# Patient Record
Sex: MALE | Race: WHITE | Employment: UNEMPLOYED | URBAN - METROPOLITAN AREA
[De-identification: names, ages, dates, MRNs, and addresses within clinical notes are randomized per-mention and may not be internally consistent; named-entity substitution may affect disease eponyms.]

---

## 2023-03-26 ENCOUNTER — APPOINTMENT (OUTPATIENT)
Dept: GENERAL RADIOLOGY | Age: 63
End: 2023-03-26
Payer: COMMERCIAL

## 2023-03-26 ENCOUNTER — HOSPITAL ENCOUNTER (EMERGENCY)
Age: 63
Discharge: ANOTHER HOSPITAL | End: 2023-03-26
Attending: EMERGENCY MEDICINE
Payer: COMMERCIAL

## 2023-03-26 VITALS
OXYGEN SATURATION: 100 % | DIASTOLIC BLOOD PRESSURE: 61 MMHG | WEIGHT: 148 LBS | BODY MASS INDEX: 22.43 KG/M2 | HEART RATE: 92 BPM | TEMPERATURE: 97.6 F | SYSTOLIC BLOOD PRESSURE: 97 MMHG | HEIGHT: 68 IN | RESPIRATION RATE: 21 BRPM

## 2023-03-26 DIAGNOSIS — R42 POSTURAL DIZZINESS WITH NEAR SYNCOPE: Primary | ICD-10-CM

## 2023-03-26 DIAGNOSIS — R55 POSTURAL DIZZINESS WITH NEAR SYNCOPE: Primary | ICD-10-CM

## 2023-03-26 DIAGNOSIS — K92.1 GASTROINTESTINAL HEMORRHAGE WITH MELENA: ICD-10-CM

## 2023-03-26 LAB
ABSOLUTE EOS #: 0.05 K/UL (ref 0–0.44)
ABSOLUTE IMMATURE GRANULOCYTE: 0.07 K/UL (ref 0–0.3)
ABSOLUTE LYMPH #: 2.2 K/UL (ref 1.1–3.7)
ABSOLUTE MONO #: 0.81 K/UL (ref 0.1–1.2)
ANION GAP SERPL CALCULATED.3IONS-SCNC: 13 MMOL/L (ref 9–17)
BASOPHILS # BLD: 1 % (ref 0–2)
BASOPHILS ABSOLUTE: 0.08 K/UL (ref 0–0.2)
BNP SERPL-MCNC: 2199 PG/ML
BUN SERPL-MCNC: 61 MG/DL (ref 8–23)
CALCIUM SERPL-MCNC: 8.9 MG/DL (ref 8.6–10.4)
CHLORIDE SERPL-SCNC: 97 MMOL/L (ref 98–107)
CO2 SERPL-SCNC: 21 MMOL/L (ref 20–31)
CREAT SERPL-MCNC: 0.91 MG/DL (ref 0.7–1.2)
EOSINOPHILS RELATIVE PERCENT: 1 % (ref 1–4)
GFR SERPL CREATININE-BSD FRML MDRD: >60 ML/MIN/1.73M2
GLUCOSE SERPL-MCNC: 116 MG/DL (ref 70–99)
HCT VFR BLD AUTO: 28.9 % (ref 40.7–50.3)
HGB BLD-MCNC: 8.9 G/DL (ref 13–17)
IMMATURE GRANULOCYTES: 1 %
INR PPP: 3.2
LYMPHOCYTES # BLD: 21 % (ref 24–43)
MAGNESIUM SERPL-MCNC: 1.6 MG/DL (ref 1.6–2.6)
MCH RBC QN AUTO: 29.6 PG (ref 25.2–33.5)
MCHC RBC AUTO-ENTMCNC: 30.8 G/DL (ref 28.4–34.8)
MCV RBC AUTO: 96 FL (ref 82.6–102.9)
MONOCYTES # BLD: 8 % (ref 3–12)
NRBC AUTOMATED: 0 PER 100 WBC
PDW BLD-RTO: 14.8 % (ref 11.8–14.4)
PLATELET # BLD AUTO: 347 K/UL (ref 138–453)
PMV BLD AUTO: 9 FL (ref 8.1–13.5)
POTASSIUM SERPL-SCNC: 4.9 MMOL/L (ref 3.7–5.3)
PROTHROMBIN TIME: 32.5 SEC (ref 11.7–14.9)
RBC # BLD: 3.01 M/UL (ref 4.21–5.77)
RBC # BLD: ABNORMAL 10*6/UL
SEG NEUTROPHILS: 68 % (ref 36–65)
SEGMENTED NEUTROPHILS ABSOLUTE COUNT: 7.33 K/UL (ref 1.5–8.1)
SODIUM SERPL-SCNC: 131 MMOL/L (ref 135–144)
TROPONIN I SERPL DL<=0.01 NG/ML-MCNC: 32 NG/L (ref 0–22)
TROPONIN I SERPL DL<=0.01 NG/ML-MCNC: 35 NG/L (ref 0–22)
WBC # BLD AUTO: 10.5 K/UL (ref 3.5–11.3)

## 2023-03-26 PROCEDURE — 84484 ASSAY OF TROPONIN QUANT: CPT

## 2023-03-26 PROCEDURE — 85025 COMPLETE CBC W/AUTO DIFF WBC: CPT

## 2023-03-26 PROCEDURE — 93005 ELECTROCARDIOGRAM TRACING: CPT | Performed by: EMERGENCY MEDICINE

## 2023-03-26 PROCEDURE — 99285 EMERGENCY DEPT VISIT HI MDM: CPT

## 2023-03-26 PROCEDURE — 2580000003 HC RX 258: Performed by: EMERGENCY MEDICINE

## 2023-03-26 PROCEDURE — 96366 THER/PROPH/DIAG IV INF ADDON: CPT

## 2023-03-26 PROCEDURE — C9113 INJ PANTOPRAZOLE SODIUM, VIA: HCPCS | Performed by: EMERGENCY MEDICINE

## 2023-03-26 PROCEDURE — 96367 TX/PROPH/DG ADDL SEQ IV INF: CPT

## 2023-03-26 PROCEDURE — 94761 N-INVAS EAR/PLS OXIMETRY MLT: CPT

## 2023-03-26 PROCEDURE — 85610 PROTHROMBIN TIME: CPT

## 2023-03-26 PROCEDURE — 96365 THER/PROPH/DIAG IV INF INIT: CPT

## 2023-03-26 PROCEDURE — 80048 BASIC METABOLIC PNL TOTAL CA: CPT

## 2023-03-26 PROCEDURE — 83735 ASSAY OF MAGNESIUM: CPT

## 2023-03-26 PROCEDURE — 83880 ASSAY OF NATRIURETIC PEPTIDE: CPT

## 2023-03-26 PROCEDURE — 6360000002 HC RX W HCPCS: Performed by: EMERGENCY MEDICINE

## 2023-03-26 PROCEDURE — 71045 X-RAY EXAM CHEST 1 VIEW: CPT

## 2023-03-26 PROCEDURE — 96361 HYDRATE IV INFUSION ADD-ON: CPT

## 2023-03-26 RX ORDER — 0.9 % SODIUM CHLORIDE 0.9 %
500 INTRAVENOUS SOLUTION INTRAVENOUS ONCE
Status: COMPLETED | OUTPATIENT
Start: 2023-03-26 | End: 2023-03-26

## 2023-03-26 RX ORDER — 0.9 % SODIUM CHLORIDE 0.9 %
1000 INTRAVENOUS SOLUTION INTRAVENOUS ONCE
Status: DISCONTINUED | OUTPATIENT
Start: 2023-03-26 | End: 2023-03-26

## 2023-03-26 RX ORDER — MAGNESIUM SULFATE IN WATER 40 MG/ML
2000 INJECTION, SOLUTION INTRAVENOUS ONCE
Status: COMPLETED | OUTPATIENT
Start: 2023-03-26 | End: 2023-03-26

## 2023-03-26 RX ADMIN — SODIUM CHLORIDE 1000 ML: 9 INJECTION, SOLUTION INTRAVENOUS at 12:35

## 2023-03-26 RX ADMIN — SODIUM CHLORIDE 500 ML: 9 INJECTION, SOLUTION INTRAVENOUS at 14:16

## 2023-03-26 RX ADMIN — SODIUM CHLORIDE 500 ML: 9 INJECTION, SOLUTION INTRAVENOUS at 12:40

## 2023-03-26 RX ADMIN — PANTOPRAZOLE SODIUM 80 MG: 40 INJECTION, POWDER, FOR SOLUTION INTRAVENOUS at 15:48

## 2023-03-26 RX ADMIN — MAGNESIUM SULFATE HEPTAHYDRATE 2000 MG: 40 INJECTION, SOLUTION INTRAVENOUS at 13:47

## 2023-03-26 ASSESSMENT — PAIN - FUNCTIONAL ASSESSMENT: PAIN_FUNCTIONAL_ASSESSMENT: NONE - DENIES PAIN

## 2023-03-26 ASSESSMENT — ENCOUNTER SYMPTOMS
VOMITING: 1
ABDOMINAL PAIN: 0
NAUSEA: 0
COLOR CHANGE: 0
SHORTNESS OF BREATH: 0

## 2023-03-26 NOTE — ED NOTES
Dr. Frank Saavedra states he is has initiated transfer to [facility].   Pt updated.       Jaun Schaumann, RN  03/26/23 9603

## 2023-03-26 NOTE — ED NOTES
Pt to ed from home, reports dizziness, reports vomiting this morning, woke up \"not feeling right\" denies chest pain. Pt reports had a recent bypass two weeks ago and \"feeling like I was before bypass. \"      Timothy Villar RN  03/26/23 2725

## 2023-03-26 NOTE — ED NOTES
Patient ambulatory to bathroom with steady gait, resident notified patient of transfer to [facility]. Will be direct admit. Awaiting transport ETA.       Simin Shabazz RN  03/26/23 5292

## 2023-03-26 NOTE — ED NOTES
Pt updated on plan of care, updated on ETA of transport at 1700. Pt aox4, non labored RR. Denies needs.       Yulisa Cummins RN  03/26/23 4398

## 2023-03-26 NOTE — ED NOTES
Patient provided water, okay per resident. Patient reports he wants to be transferred to [facility] where his surgeons for bypass are. Wife at bedside. Resident notified patient wants transferred. Updated on plan of care, aox4, non labored RR.      Timothy Villar RN  03/26/23 9723

## 2023-03-26 NOTE — ED PROVIDER NOTES
FACULTY SIGN-OUT  ADDENDUM     Care of this patient was assumed from previous attending physician. The patient's initial evaluation and plan have been discussed with the prior provider who initially evaluated the patient. Note Started: 3:10 PM EDT    Attestation  I was available and discussed any additional care issues that arose and coordinated the management plans with the resident(s) caring for the patient during my duty period. Any areas of disagreement with resident's documentation of care or procedures are noted on the chart. I was personally present for the key portions of any/all procedures, during my duty period. I have documented in the chart those procedures where I was not present during the key portions. ED COURSE      The patient was given the following medications:  Orders Placed This Encounter   Medications    DISCONTD: 0.9 % sodium chloride bolus    0.9 % sodium chloride bolus    magnesium sulfate 2000 mg in 50 mL IVPB premix    0.9 % sodium chloride bolus    pantoprazole (PROTONIX) 80 mg in sodium chloride 0.9 % 50 mL bolus       RECENT VITALS:   Temp: 97.6 °F (36.4 °C), Heart Rate: 92, Resp: 24, BP: 98/73    MEDICAL DECISION MAKING        Digna Aleman is a 58 y.o. male who presents to the Emergency Department with complaints of near syncope. Hgb 8.9, Prob GI bleed and dehydration.  Plan tx to [facility] per pt request, awaiting transport      Florencio Burris MD  Attending Emergency Physician    (Please note that portions of this note were completed with a voice recognition program.  Efforts were made to edit the dictations but occasionally words are mis-transcribed.)          Florencio Burris MD  03/26/23 5849
[facility]     Emergency Department     Faculty Attestation    I performed a history and physical examination of the patient and discussed management with the resident. I reviewed the residents note and agree with the documented findings including all diagnostic interpretations and plan of care. Any areas of disagreement are noted on the chart. I was personally present for the key portions of any procedures. I have documented in the chart those procedures where I was not present during the key portions. I have reviewed the emergency nurses triage note. I agree with the chief complaint, past medical history, past surgical history, allergies, medications, social and family history as documented unless otherwise noted below. Documentation of the HPI, Physical Exam and Medical Decision Making performed by scribalhaji is based on my personal performance of the HPI, PE and MDM. For Physician Assistant/ Nurse Practitioner cases/documentation I have personally evaluated this patient and have completed at least one if not all key elements of the E/M (history, physical exam, and MDM). Additional findings are as noted. Primary Care Physician: Rafael Mcduffie MD    VITAL SIGNS:   height is 5' 8\" (1.727 m) and weight is 148 lb (67.1 kg). His oral temperature is 97.6 °F (36.4 °C). His blood pressure is 98/73 and his pulse is 92. His respiration is 24 and oxygen saturation is 98%. Medical Decision Making  Near syncope. Orthostatic in nature (of note he is on warfarin) and has had multiple episodes of diarrhea recently. He did have 1 episode of black stool with the diarrhea. No nausea no vomiting no hematemesis denies abdominal pain. He is requesting transfer to [facility] if needed for admission as all of his care is established there.     Amount and/or Complexity of Data Reviewed  Independent Historian: spouse  External Data Reviewed: labs, radiology, ECG
Needs: Not on file   Physical Activity: Not on file   Stress: Not on file   Social Connections: Not on file   Intimate Partner Violence: Not on file   Housing Stability: Not on file       No family history on file. Allergies:  Patient has no known allergies. Home Medications:  Prior to Admission medications    Not on File       REVIEW OF SYSTEMS       Review of Systems   Constitutional:  Negative for fever. Eyes:  Negative for visual disturbance. Respiratory:  Negative for shortness of breath. Cardiovascular:  Negative for chest pain. Gastrointestinal:  Positive for vomiting. Negative for abdominal pain and nausea. Skin:  Negative for color change. Allergic/Immunologic: Negative for environmental allergies and food allergies. Neurological:  Positive for dizziness and light-headedness. Negative for headaches. Psychiatric/Behavioral:  Negative for confusion. PHYSICAL EXAM      INITIAL VITALS:   BP 98/73   Pulse 92   Temp 97.6 °F (36.4 °C) (Oral)   Resp 24   Ht 5' 8\" (1.727 m)   Wt 148 lb (67.1 kg)   SpO2 98%   BMI 22.50 kg/m²     Physical Exam  HENT:      Head: Normocephalic and atraumatic. Cardiovascular:      Rate and Rhythm: Normal rate and regular rhythm. Pulses: Normal pulses. Pulmonary:      Effort: Pulmonary effort is normal.      Breath sounds: Normal breath sounds. Abdominal:      General: There is no distension. Palpations: Abdomen is soft. Tenderness: There is no abdominal tenderness. Skin:     General: Skin is warm. Capillary Refill: Capillary refill takes less than 2 seconds. Neurological:      General: No focal deficit present. Mental Status: He is alert and oriented to person, place, and time. Sensory: No sensory deficit. Motor: No weakness. Psychiatric:         Mood and Affect: Mood normal.         DDX/DIAGNOSTIC RESULTS / EMERGENCY DEPARTMENT COURSE / MDM     Medical Decision Making  Patient was evaluated at bedside.

## 2023-03-26 NOTE — ED NOTES
Bedside report given to [facility] transport. Patient aox4, no distress noted, request IV to be removed d/t discomfort, removed per patient request. [facility] notified patient IV removed per request. Patient wants Raymond to place IV if needed.       Daly Torres RN  03/26/23 5219

## 2023-03-26 NOTE — ED NOTES
Resident notified of unable to remain standing d/t dizziness. Had to sit back down.       Celeste Soni RN  03/26/23 4287

## 2023-03-28 LAB
EKG ATRIAL RATE: 95 BPM
EKG P AXIS: 61 DEGREES
EKG P-R INTERVAL: 192 MS
EKG Q-T INTERVAL: 390 MS
EKG QRS DURATION: 96 MS
EKG QTC CALCULATION (BAZETT): 490 MS
EKG R AXIS: 32 DEGREES
EKG T AXIS: 72 DEGREES
EKG VENTRICULAR RATE: 95 BPM

## 2023-03-28 PROCEDURE — 93010 ELECTROCARDIOGRAM REPORT: CPT | Performed by: INTERNAL MEDICINE

## 2025-01-30 ENCOUNTER — TELEPHONE (OUTPATIENT)
Age: 65
End: 2025-01-30

## 2025-01-30 NOTE — TELEPHONE ENCOUNTER
Referral information clarified: INR goal = 2.5-3.5 for mechanical mitral valve, with indefinite duration.  She also provided an e mail for Dr Mosqueda:  [email or web address]

## 2025-01-30 NOTE — TELEPHONE ENCOUNTER
Returned call to patient to schedule initial visit.     Patient is getting his INR checked elsewhere and will continue to do so.    Fanny THOMAS Ph., CACP, Clinical Pharmacist  Anticoagulation Services, [facility]  1/30/2025  3:01 PM

## 2025-01-30 NOTE — TELEPHONE ENCOUNTER
Clinic received new referral for AC, called to schedule, left vm message.  Need to confirm patient wants to switch from [facility] to us. Recent INR check on 1/24/2025 was 1.9, subtherapeutic based on INR goal on referral.     Also called [facility] to clarify Diagnoses and INR goal.  Referral includes bioprosthetic mitral valve - lifelong therapy not indicated for bioprosthetic.  I called MD office and spoke with nurse Amena regarding appropriate Dx and INR goal, and duration of therapy.  Additionally we need a CPA so requested good email for MD.  She states patient has both mechanical and bioprosthetic, aortic and mitral but does not know which is which.   She will contact MD.    Lluvia Arnold, MIRANDA, CACP  Clinical Pharmacist Medication Management  1/30/2025  10:25 AM

## 2025-02-11 ENCOUNTER — ANTI-COAG VISIT (OUTPATIENT)
Age: 65
End: 2025-02-11
Payer: COMMERCIAL

## 2025-02-11 DIAGNOSIS — I48.91 ATRIAL FIBRILLATION, UNSPECIFIED TYPE (HCC): Primary | ICD-10-CM

## 2025-02-11 DIAGNOSIS — Z95.2 H/O MITRAL VALVE REPLACEMENT WITH MECHANICAL VALVE: ICD-10-CM

## 2025-02-11 LAB
INTERNATIONAL NORMALIZATION RATIO, POC: 2.3
PROTHROMBIN TIME, POC: 27.4

## 2025-02-11 PROCEDURE — 99213 OFFICE O/P EST LOW 20 MIN: CPT

## 2025-02-11 PROCEDURE — 85610 PROTHROMBIN TIME: CPT

## 2025-02-11 RX ORDER — WARFARIN SODIUM 3 MG/1
6-9 TABLET ORAL DAILY
COMMUNITY
Start: 2024-11-08

## 2025-02-11 RX ORDER — METOPROLOL SUCCINATE 25 MG/1
12.5 TABLET, EXTENDED RELEASE ORAL DAILY
COMMUNITY

## 2025-02-11 RX ORDER — LISINOPRIL 2.5 MG/1
2.5 TABLET ORAL EVERY MORNING
COMMUNITY

## 2025-02-11 RX ORDER — BUPROPION HYDROCHLORIDE 150 MG/1
150 TABLET ORAL EVERY MORNING
COMMUNITY

## 2025-02-11 RX ORDER — ATORVASTATIN CALCIUM 40 MG/1
40 TABLET, FILM COATED ORAL DAILY
COMMUNITY

## 2025-02-11 RX ORDER — LANOLIN ALCOHOL/MO/W.PET/CERES
1000 CREAM (GRAM) TOPICAL EVERY MORNING
COMMUNITY

## 2025-02-11 RX ORDER — PANTOPRAZOLE SODIUM 40 MG/1
40 TABLET, DELAYED RELEASE ORAL DAILY
COMMUNITY
Start: 2024-11-11

## 2025-02-11 NOTE — PROGRESS NOTES
Medication Management Service, Warfarin Management  [facility], [phone]  First visit to [facility] Office.  Date: 2025     Education provided on indication and mechanism of warfarin, compliance, appropriate follow-up & monitoring, dietary and medication interactions, potential thromboembolic & bleeding complications, when to seek medical care, and office policy.    Patient has been on warfarin in the past, regimen: 9 mg on  and 6 mg all other days of the week.     Patient states compliant with regimen.  No bleeding or thromboembolic side effects noted.  No significant med or dietary changes.  No significant recent illness or disease state changes.     Subjective:   Rasheed Bloom is a 64 y.o. male who presents to clinic today for anticoagulation monitoring and adjustment.  Patient seen in clinic for warfarin management due to  Indication:   mechanical mitral valve.   INR goal: of 2.5-3.5.  Duration of therapy: indefinite.    Assessment and PLAN   PT/INR done in office per protocol.   INR today is 2.3, subtherapeutic. Patient denies usual causes for out of range INR today.          Plan:  Will increase to 3 tablets today for a dose of 9 mg, then continue current regimen of warfarin 9 mg on  and 6 mg on all other days of the week.   Using warfarin 3 mg tablets.    Medication list updated today.     Recheck INR in 2 week(s).   Patient seen in room # 1.    Patient usually has 1-2 servings of green vegetables per week.     Patient verbalized understanding of dosing directions and information discussed. Dosing schedule given to patient. Progress note sent to referring office.        Fanny MCHUGH. Cheko., CACP, Clinical Pharmacist  Anticoagulation Services, [facility]  2025  2:31 PM      For Pharmacy Admin Tracking Only    Intervention Detail: Adherence Monitorin and Dose Adjustment: 1, reason: Therapy Optimization  Total # of Interventions Recommended:

## 2025-02-26 ENCOUNTER — ANTI-COAG VISIT (OUTPATIENT)
Age: 65
End: 2025-02-26
Payer: MEDICARE

## 2025-02-26 DIAGNOSIS — Z95.2 H/O MITRAL VALVE REPLACEMENT WITH MECHANICAL VALVE: Primary | ICD-10-CM

## 2025-02-26 LAB
INR BLD: 3.1
PROTIME: 36.8

## 2025-02-26 PROCEDURE — 99211 OFF/OP EST MAY X REQ PHY/QHP: CPT

## 2025-02-26 PROCEDURE — 85610 PROTHROMBIN TIME: CPT

## 2025-02-26 NOTE — PROGRESS NOTES
Medication Management Service, Warfarin Management  [facility], [phone]  Visit Date: 2/26/2025   Subjective:   Rasheed Bloom is a 64 y.o. male who presents to clinic today for anticoagulation monitoring and adjustment.  Patient seen in clinic for warfarin management due to  Indication:   mechanical mitral valve.   INR goal: of 2.5-3.5.  Duration of therapy: indefinite.    Assessment and PLAN   PT/INR done in office per protocol.   INR today is 3.1, therapeutic.        Plan:  Will continue current regimen of warfarin 9 mg every Thursday; 6 mg all other days of the week.   Using warfarin 3 mg tablets.    Recheck INR in 4 week(s).   Patient seen in room # 1.      Patient verbalized understanding of dosing directions and information discussed. Dosing schedule given to patient. Progress note sent to referring office.       Electronically signed by TERRA PHAN on 2/26/25 at 9:24 AM EST    For Pharmacy Admin Tracking Only    Intervention Detail:   Total # of Interventions Recommended: 0  Total # of Interventions Accepted: 0  Time Spent (min): 20

## 2025-03-07 ENCOUNTER — TELEPHONE (OUTPATIENT)
Age: 65
End: 2025-03-07

## 2025-03-07 NOTE — TELEPHONE ENCOUNTER
Spoke with the patient and they want their warfarin to be managed at [facility] considering his new insurance. Cancelled upcoming appointment on 3/26.    oYhan Oliver, PharmD Candidate 2025